# Patient Record
Sex: MALE | Race: WHITE | URBAN - METROPOLITAN AREA
[De-identification: names, ages, dates, MRNs, and addresses within clinical notes are randomized per-mention and may not be internally consistent; named-entity substitution may affect disease eponyms.]

---

## 2017-06-19 ENCOUNTER — TELEPHONE (OUTPATIENT)
Dept: BARIATRICS/WEIGHT MGMT | Age: 37
End: 2017-06-19

## 2017-06-23 ENCOUNTER — HOSPITAL ENCOUNTER (OUTPATIENT)
Dept: ULTRASOUND IMAGING | Age: 37
Discharge: HOME | End: 2017-06-23
Attending: INTERNAL MEDICINE | Admitting: INTERNAL MEDICINE

## 2017-06-23 DIAGNOSIS — R74.01 NONSPECIFIC ELEVATION OF LEVELS OF TRANSAMINASE OR LACTIC ACID DEHYDROGENASE (LDH): ICD-10-CM

## 2017-06-23 DIAGNOSIS — R74.02 NONSPECIFIC ELEVATION OF LEVELS OF TRANSAMINASE OR LACTIC ACID DEHYDROGENASE (LDH): ICD-10-CM

## 2017-06-23 DIAGNOSIS — R74.01 NONSPECIFIC ELEVATION OF LEVELS OF TRANSAMINASE AND LACTIC ACID DEHYDROGENASE (LDH): ICD-10-CM

## 2017-06-23 DIAGNOSIS — R74.02 NONSPECIFIC ELEVATION OF LEVELS OF TRANSAMINASE AND LACTIC ACID DEHYDROGENASE (LDH): ICD-10-CM

## 2018-01-24 ENCOUNTER — OFFICE VISIT (OUTPATIENT)
Dept: BARIATRICS/WEIGHT MGMT | Age: 38
End: 2018-01-24

## 2018-01-24 VITALS
RESPIRATION RATE: 16 BRPM | BODY MASS INDEX: 41.75 KG/M2 | SYSTOLIC BLOOD PRESSURE: 137 MMHG | HEART RATE: 107 BPM | WEIGHT: 315 LBS | DIASTOLIC BLOOD PRESSURE: 92 MMHG | HEIGHT: 73 IN

## 2018-01-24 DIAGNOSIS — R53.81 MALAISE AND FATIGUE: ICD-10-CM

## 2018-01-24 DIAGNOSIS — F32.9 REACTIVE DEPRESSION: ICD-10-CM

## 2018-01-24 DIAGNOSIS — R53.83 MALAISE AND FATIGUE: ICD-10-CM

## 2018-01-24 DIAGNOSIS — M54.2 NECK PAIN: ICD-10-CM

## 2018-01-24 DIAGNOSIS — R10.84 GENERALIZED ABDOMINAL PAIN: ICD-10-CM

## 2018-01-24 DIAGNOSIS — G89.29 CHRONIC BILATERAL LOW BACK PAIN WITHOUT SCIATICA: ICD-10-CM

## 2018-01-24 DIAGNOSIS — M54.50 CHRONIC BILATERAL LOW BACK PAIN WITHOUT SCIATICA: ICD-10-CM

## 2018-01-24 DIAGNOSIS — G44.211 INTRACTABLE EPISODIC TENSION-TYPE HEADACHE: ICD-10-CM

## 2018-01-24 DIAGNOSIS — R06.02 SOB (SHORTNESS OF BREATH): ICD-10-CM

## 2018-01-24 DIAGNOSIS — F41.9 ANXIETY: ICD-10-CM

## 2018-01-24 DIAGNOSIS — E66.01 MORBID OBESITY WITH BMI OF 50.0-59.9, ADULT (HCC): Primary | ICD-10-CM

## 2018-01-24 DIAGNOSIS — K76.0 FATTY LIVER: ICD-10-CM

## 2018-01-24 DIAGNOSIS — R11.0 NAUSEA: ICD-10-CM

## 2018-01-24 DIAGNOSIS — N39.3 SUI (STRESS URINARY INCONTINENCE), MALE: ICD-10-CM

## 2018-01-24 DIAGNOSIS — E11.69 DIABETES MELLITUS TYPE 2 IN OBESE (HCC): ICD-10-CM

## 2018-01-24 DIAGNOSIS — I10 ESSENTIAL HYPERTENSION: ICD-10-CM

## 2018-01-24 DIAGNOSIS — G47.33 OSA (OBSTRUCTIVE SLEEP APNEA): ICD-10-CM

## 2018-01-24 DIAGNOSIS — M79.89 LEG SWELLING: ICD-10-CM

## 2018-01-24 DIAGNOSIS — R12 HEARTBURN: ICD-10-CM

## 2018-01-24 DIAGNOSIS — E66.9 DIABETES MELLITUS TYPE 2 IN OBESE (HCC): ICD-10-CM

## 2018-01-24 PROCEDURE — S9470 NUTRITIONAL COUNSELING, DIET: HCPCS | Performed by: SURGERY

## 2018-01-24 PROCEDURE — 99205 OFFICE O/P NEW HI 60 MIN: CPT | Performed by: SURGERY

## 2018-01-24 RX ORDER — LISINOPRIL 5 MG/1
5 TABLET ORAL DAILY
COMMUNITY

## 2018-01-24 RX ORDER — VENLAFAXINE HYDROCHLORIDE 150 MG/1
150 TABLET, EXTENDED RELEASE ORAL
COMMUNITY

## 2018-01-24 RX ORDER — BACITRACIN 500 UNIT/G
OINTMENT (GRAM) TOPICAL
COMMUNITY
End: 2018-04-04 | Stop reason: ALTCHOICE

## 2018-01-24 RX ORDER — VIT C/B6/B5/MAGNESIUM/HERB 173 50-5-6-5MG
CAPSULE ORAL 3 TIMES DAILY
COMMUNITY

## 2018-01-24 RX ORDER — HYDROCODONE BITARTRATE AND ACETAMINOPHEN 5; 325 MG/1; MG/1
1 TABLET ORAL EVERY 6 HOURS PRN
COMMUNITY

## 2018-01-24 RX ORDER — IBUPROFEN 200 MG
200 TABLET ORAL EVERY 6 HOURS PRN
COMMUNITY
End: 2018-05-16

## 2018-01-24 ASSESSMENT — ENCOUNTER SYMPTOMS
DOUBLE VISION: 0
SPUTUM PRODUCTION: 0
PHOTOPHOBIA: 0
STRIDOR: 0
CONSTIPATION: 0
EYE REDNESS: 0
BLURRED VISION: 0
HEARTBURN: 1
DIARRHEA: 0
WHEEZING: 0
EYE PAIN: 0
VOMITING: 0
BACK PAIN: 1
NAUSEA: 1
EYE DISCHARGE: 0
ORTHOPNEA: 0
BLOOD IN STOOL: 0
SORE THROAT: 0
ABDOMINAL PAIN: 1
COUGH: 0
SHORTNESS OF BREATH: 1
HEMOPTYSIS: 0

## 2018-01-24 NOTE — PROGRESS NOTES
Bariatric Surgery Consultation    Betty Pizarro, 1980, 30 y.o.,  male, CSN:   01/29/18     Chief Complaint:    Chief Complaint   Patient presents with    Bariatric, Initial Visit     1st WM visit, diet, exercise and pre-surgical weight. SUBJECTIVE:  Serafin Jimenez is a 30 y.o. male being seen for morbid obesity, considering weight loss surgery; Christophe's, Height: 6' 1\" (185.4 cm), Weight: (!) 377 lb 8 oz (171.2 kg), Current Body mass index is 49.81 kg/m². The patient's PCP is Lizette Acosta MD    HPI:  Serafin Jimenez has suffered from overweight / obesity for (27) years, and was of gradual onset but progressive course - tried MANY different diets and on and off over the weeks, months and years depression   and work status is works  on workers comp now disc injury and has 2 children. L4/L5 injury  HTN  STEVIE  DM type II - A1C 9.1 1 month ago  Depression  Anxiety  Back pain  Fatty liver        Impression   Prominent fatty infiltration of the liver.  Otherwise unremarkable right   upper quadrant ultrasound. Christophe's life is significantly affected by weight related to his co-morbidities. The patient has also tried but failed self directed diet and exercise. Comorbid Conditions:  Significant diseases affecting this patient are   Past Medical History:   Diagnosis Date    Anxiety     Depression     Fatty liver     Hypertension     Obesity     Sleep apnea     Type 2 diabetes mellitus without complication ([facility] [address]    . Review of Systems - Review of Systems   Constitutional: Positive for malaise/fatigue. Negative for chills, diaphoresis, fever and weight loss. HENT: Negative for congestion, ear discharge, ear pain, hearing loss, nosebleeds, sore throat and tinnitus. Eyes: Negative for blurred vision, double vision, photophobia, pain, discharge and redness. Respiratory: Positive for shortness of breath.  Negative for cough, hemoptysis, sputum production, wheezing and stridor. Cardiovascular: Positive for leg swelling. Negative for chest pain, palpitations, orthopnea, claudication and PND. Gastrointestinal: Positive for abdominal pain, heartburn and nausea. Negative for blood in stool, constipation, diarrhea, melena and vomiting. Genitourinary: Positive for frequency and urgency. Negative for dysuria, flank pain and hematuria. Musculoskeletal: Positive for back pain, joint pain and neck pain. Negative for falls and myalgias. Neurological: Positive for headaches. Negative for dizziness, tingling, tremors, sensory change, speech change, focal weakness, seizures, loss of consciousness and weakness. Endo/Heme/Allergies: Negative for environmental allergies and polydipsia. Does not bruise/bleed easily. Psychiatric/Behavioral: Positive for depression. Negative for hallucinations, memory loss, substance abuse and suicidal ideas. The patient is not nervous/anxious and does not have insomnia. Otherwise per HPI. Allergies:  No Known Allergies    Medications:  Current Outpatient Prescriptions   Medication Sig Dispense Refill    metFORMIN (GLUCOPHAGE) 1000 MG tablet Take 1,000 mg by mouth 2 times daily (with meals)      dapagliflozin (FARXIGA) 5 MG tablet Take 5 mg by mouth every morning      HYDROcodone-acetaminophen (NORCO) 5-325 MG per tablet Take 1 tablet by mouth every 6 hours as needed for Pain.       lisinopril (PRINIVIL;ZESTRIL) 5 MG tablet Take 5 mg by mouth daily      venlafaxine 150 MG extended release tablet Take 150 mg by mouth daily (with breakfast)      Saw [place] 160 MG CAPS Take by mouth      Turmeric 500 MG CAPS Take by mouth three times daily      Quercetin 250 MG TABS Take 500 tablets by mouth Daily      Insulin Degludec (TRESIBA FLEXTOUCH) 200 UNIT/ML SOPN Inject into the skin      insulin lispro (HUMALOG) 100 UNIT/ML injection vial Inject 10 Units into the skin 3 times daily (before meals)      ibuprofen (ADVIL;MOTRIN) 200 Cognition and memory are normal.     ASSESSMENT & PLAN:    Patient Active Problem List   Diagnosis    Morbid obesity with BMI of 50.0-59.9, adult ([facility] [address]    Reactive depression    Essential hypertension    STEVIE (obstructive sleep apnea)    Diabetes mellitus type 2 in obese (HCC)    Anxiety    Chronic bilateral low back pain without sciatica    Fatty liver    Malaise and fatigue    SOB (shortness of breath)    Leg swelling    Generalized abdominal pain    Heartburn    Nausea    DEONTE (stress urinary incontinence), male    Neck pain    Intractable episodic tension-type headache       L4/L5 injury  HTN  STEVIE  DM type II - A1C 9.1 1 month ago  Depression  Anxiety  Back pain  Fatty liver        Impression   Prominent fatty infiltration of the liver.  Otherwise unremarkable right   upper quadrant ultrasound. The patient is good candidate for surgery. The patient is interested in the Robotic Sleeve Gastrectomy. Will continue work up toward surgery. Counseled extensively regardin) DIET and MONITOR the Weight:  - 1500 Kcal Diet/day. - Write down everything you eat and drink for 1 wk  - No calories from drinks  - Slim fast diet: 4 cans per day 1-2 days a week with only NO calories drinks those days    2) EXERCISE: 1 hr/day 5 days a week    3) DIETITIAN / NUTRITIONAL CONSULT, evaluation and counseling to roy healthy diet ~1500 Kcal /day    4) EXERCISE PHYSIOLOGIST CONSULT    5) PSYCHOLOGICAL EVALUATION    6) MONTHLY FOLLOW UP,  to follow and document diet and exercise trial    7) Planning a Sleeve Gastrectomy in few months    8) 2 Pictures were taken today to concretely monitor weight loss over time. 9) CARDIOLOGY OPTIMIZATION AND CLEARANCE BEFORE SURGERY    10) PULMONOLGY OPTIMIZATION AND CLEARANCE BEFORE SURGERY    WILL CHECK BASELINE BARIATRIC COMPREHENSIVE LABS.       Orders Placed This Encounter   Procedures    Basic Metabolic Panel    CBC Auto Differential    Hemoglobin A1C   

## 2018-01-25 PROBLEM — R11.0 NAUSEA: Status: ACTIVE | Noted: 2018-01-25

## 2018-01-25 PROBLEM — E11.69 DIABETES MELLITUS TYPE 2 IN OBESE (HCC): Status: ACTIVE | Noted: 2018-01-25

## 2018-01-25 PROBLEM — M79.89 LEG SWELLING: Status: ACTIVE | Noted: 2018-01-25

## 2018-01-25 PROBLEM — I10 ESSENTIAL HYPERTENSION: Status: ACTIVE | Noted: 2018-01-25

## 2018-01-25 PROBLEM — R53.83 MALAISE AND FATIGUE: Status: ACTIVE | Noted: 2018-01-25

## 2018-01-25 PROBLEM — F32.9 REACTIVE DEPRESSION: Status: ACTIVE | Noted: 2018-01-25

## 2018-01-25 PROBLEM — M54.2 NECK PAIN: Status: ACTIVE | Noted: 2018-01-25

## 2018-01-25 PROBLEM — R12 HEARTBURN: Status: ACTIVE | Noted: 2018-01-25

## 2018-01-25 PROBLEM — G47.33 OSA (OBSTRUCTIVE SLEEP APNEA): Status: ACTIVE | Noted: 2018-01-25

## 2018-01-25 PROBLEM — R53.81 MALAISE AND FATIGUE: Status: ACTIVE | Noted: 2018-01-25

## 2018-01-25 PROBLEM — R06.02 SOB (SHORTNESS OF BREATH): Status: ACTIVE | Noted: 2018-01-25

## 2018-01-25 PROBLEM — M54.50 CHRONIC BILATERAL LOW BACK PAIN WITHOUT SCIATICA: Status: ACTIVE | Noted: 2018-01-25

## 2018-01-25 PROBLEM — R10.84 GENERALIZED ABDOMINAL PAIN: Status: ACTIVE | Noted: 2018-01-25

## 2018-01-25 PROBLEM — E66.9 DIABETES MELLITUS TYPE 2 IN OBESE (HCC): Status: ACTIVE | Noted: 2018-01-25

## 2018-01-25 PROBLEM — F41.9 ANXIETY: Status: ACTIVE | Noted: 2018-01-25

## 2018-01-25 PROBLEM — N39.3 SUI (STRESS URINARY INCONTINENCE), MALE: Status: ACTIVE | Noted: 2018-01-25

## 2018-01-25 PROBLEM — G44.211 INTRACTABLE EPISODIC TENSION-TYPE HEADACHE: Status: ACTIVE | Noted: 2018-01-25

## 2018-01-25 PROBLEM — K76.0 FATTY LIVER: Status: ACTIVE | Noted: 2018-01-25

## 2018-01-25 PROBLEM — G89.29 CHRONIC BILATERAL LOW BACK PAIN WITHOUT SCIATICA: Status: ACTIVE | Noted: 2018-01-25

## 2018-01-30 LAB
ALBUMIN SERPL-MCNC: 4.2 G/DL
ALP BLD-CCNC: 72 U/L
ALT SERPL-CCNC: 61 U/L
ANION GAP SERPL CALCULATED.3IONS-SCNC: 13.2 MMOL/L
AST SERPL-CCNC: 25 U/L
AVERAGE GLUCOSE: NORMAL
BASOPHILS ABSOLUTE: 0.06 /ΜL
BASOPHILS RELATIVE PERCENT: 1 %
BILIRUB SERPL-MCNC: 0.5 MG/DL (ref 0.1–1.4)
BUN BLDV-MCNC: 18 MG/DL
CALCIUM SERPL-MCNC: 9.7 MG/DL
CHLORIDE BLD-SCNC: 102 MMOL/L
CHOLESTEROL, TOTAL: 171 MG/DL
CHOLESTEROL/HDL RATIO: 5.5
CO2: 23 MMOL/L
CREAT SERPL-MCNC: 0.7 MG/DL
EOSINOPHILS ABSOLUTE: 0.19 /ΜL
EOSINOPHILS RELATIVE PERCENT: 3.1 %
GFR CALCULATED: 136
GLUCOSE BLD-MCNC: 209 MG/DL
HBA1C MFR BLD: 10.3 %
HCT VFR BLD CALC: 43.6 % (ref 41–53)
HDLC SERPL-MCNC: 31 MG/DL (ref 35–70)
HEMOGLOBIN: 13 G/DL (ref 13.5–17.5)
LDL CHOLESTEROL CALCULATED: 104 MG/DL (ref 0–160)
LYMPHOCYTES ABSOLUTE: 2.07 /ΜL
LYMPHOCYTES RELATIVE PERCENT: 33.4 %
MCH RBC QN AUTO: 20.1 PG
MCHC RBC AUTO-ENTMCNC: 29.8 G/DL
MCV RBC AUTO: 67.3 FL
MONOCYTES ABSOLUTE: 0.35 /ΜL
MONOCYTES RELATIVE PERCENT: 5.7 %
NEUTROPHILS ABSOLUTE: 3.49 /ΜL
NEUTROPHILS RELATIVE PERCENT: 56.3 %
PDW BLD-RTO: 17.9 %
PLATELET # BLD: 230 K/ΜL
PMV BLD AUTO: 11 FL
POTASSIUM SERPL-SCNC: 4.3 MMOL/L
RBC # BLD: 6.48 10^6/ΜL
SODIUM BLD-SCNC: 138 MMOL/L
TOTAL PROTEIN: 7.5
TRIGL SERPL-MCNC: 178 MG/DL
VLDLC SERPL CALC-MCNC: 36 MG/DL
WBC # BLD: 6.19 10^3/ML

## 2018-01-31 ENCOUNTER — INITIAL CONSULT (OUTPATIENT)
Dept: BARIATRICS/WEIGHT MGMT | Age: 38
End: 2018-01-31

## 2018-01-31 VITALS
HEART RATE: 88 BPM | WEIGHT: 315 LBS | DIASTOLIC BLOOD PRESSURE: 84 MMHG | HEIGHT: 73 IN | SYSTOLIC BLOOD PRESSURE: 128 MMHG | BODY MASS INDEX: 41.75 KG/M2

## 2018-01-31 DIAGNOSIS — E66.01 MORBID OBESITY WITH BMI OF 45.0-49.9, ADULT (HCC): Primary | ICD-10-CM

## 2018-01-31 PROCEDURE — 99999 PR OFFICE/OUTPT VISIT,PROCEDURE ONLY: CPT

## 2018-01-31 NOTE — PROGRESS NOTES
Outpatient Nutrition Counseling    REASON FOR VISIT: Initial Nutrition Counseling    Chief Complaint:    Chief Complaint   Patient presents with    Weight Management       SUBJECTIVE:  Pt here for first visit. Has started to count calories and log food intake. Staying around 2500 kcals/day and has lost 4.9 lbs in 6 days. Eating more protein and veggies and drinking more water. Has cut out most sweets and is watching portions on snacks. He has ad success in past with weight loss but never able to maintain the loss. Very motivated and supportive wife. The patient is a 30 y.o. male being seen for morbid obesity, considering weight loss surgery; Christophe's, Height: 6' 1\" (185.4 cm), Weight: (!) 372 lb 9.6 oz (169 kg), Current Body mass index is 49.16 kg/m². The patient's PCP is Marisa Leung MD   The patient first recognized that he had a weight problem about 30 years ago. The patient's lowest weight in the last five years was 300 lbs, and the highest weight in the last five years was 390 lbs. Weight Loss Program History:  The patient states he has tried meal replacement shakes, diabetic diet, exercise. The most weight lost ever with diet and exercise was 50 Lbs, but unfortunately only kept them of for a short time. Christophe's life is significantly affected by weight related to his co-morbidities. Comorbid Conditions:  Significant diseases affecting this patient are   Past Medical History:   Diagnosis Date    Anxiety     Depression     Fatty liver     Hypertension     Obesity     Sleep apnea     Type 2 diabetes mellitus without complication ([facility] [address]    . Review of Systems - ROS  Otherwise per HPI. Allergies:  No Known Allergies    Past Surgical History:  Past Surgical History:   Procedure Laterality Date    VASECTOMY  2012       Family History:  No family history on file.     Social History:  Social History     Social History    Marital status:      Spouse name: N/A    Number of

## 2018-02-07 ENCOUNTER — HOSPITAL ENCOUNTER (OUTPATIENT)
Dept: PHYSICAL THERAPY | Age: 38
Discharge: HOME | End: 2018-02-08
Attending: SURGERY | Admitting: SURGERY

## 2018-02-07 NOTE — PLAN OF CARE
Current functional level (based on UE/LE strength)  Low disability 1-19%      Plan of Care/Treatment to date:  [] Therapeutic Exercise    [] Aquatics:  [x] Therapeutic Activity    [] Ultrasound  [] Elec Stimulation  [] Gait Training     [] Cervical Traction [] Lumbar Traction  [] Neuromuscular Re-education [] Cold/hotpack [] Iontophoresis   [] Instruction in HEP       [] Manual Therapy     [] vasopneumatic            [x] Self care home management        []Dry needling trigger point point/pain management    ? Frequency/Duration: Pt not to return after Evaluation, he has a high co-pay and has workout equipment in his basement. Pt is Discharged Today. Goals:   Long term goals  Time Frame for Long term goals : All goals to be assessed by discharge  Long term goal 1: Pt to be independent with idea of HEP - MET  Long term goal 2: Pt to be instructed in alternating strength training arms/legs and cardio-fitness - MET  Electronically signed by:  Rikki Avendano PT, 2/7/2018, 1:16 PM              If you have any questions or concerns, please don't hesitate to call.   Thank you for your referral.      Physician Signature:_________________Date:____________Time: ________  By signing above, therapists plan is approved by physician

## 2018-02-08 PROCEDURE — 97161 PT EVAL LOW COMPLEX 20 MIN: CPT | Performed by: SURGERY

## 2018-02-14 DIAGNOSIS — E66.01 MORBID OBESITY WITH BMI OF 50.0-59.9, ADULT (HCC): ICD-10-CM

## 2018-02-27 ENCOUNTER — OFFICE VISIT (OUTPATIENT)
Dept: BARIATRICS/WEIGHT MGMT | Age: 38
End: 2018-02-27

## 2018-02-27 VITALS
HEIGHT: 73 IN | WEIGHT: 315 LBS | SYSTOLIC BLOOD PRESSURE: 125 MMHG | DIASTOLIC BLOOD PRESSURE: 76 MMHG | RESPIRATION RATE: 16 BRPM | BODY MASS INDEX: 41.75 KG/M2

## 2018-02-27 DIAGNOSIS — E66.01 MORBID OBESITY WITH BMI OF 45.0-49.9, ADULT (HCC): Primary | ICD-10-CM

## 2018-02-27 DIAGNOSIS — E66.9 DIABETES MELLITUS TYPE 2 IN OBESE (HCC): ICD-10-CM

## 2018-02-27 DIAGNOSIS — E11.69 DIABETES MELLITUS TYPE 2 IN OBESE (HCC): ICD-10-CM

## 2018-02-27 DIAGNOSIS — I10 ESSENTIAL HYPERTENSION: ICD-10-CM

## 2018-02-27 DIAGNOSIS — Z01.818 PRE-OP EVALUATION: ICD-10-CM

## 2018-02-27 DIAGNOSIS — G47.33 OSA (OBSTRUCTIVE SLEEP APNEA): ICD-10-CM

## 2018-02-27 PROCEDURE — 99214 OFFICE O/P EST MOD 30 MIN: CPT | Performed by: NURSE PRACTITIONER

## 2018-02-27 RX ORDER — DOXYCYCLINE HYCLATE 100 MG/1
1 CAPSULE ORAL 2 TIMES DAILY
COMMUNITY
Start: 2018-02-05 | End: 2018-04-04 | Stop reason: ALTCHOICE

## 2018-02-27 ASSESSMENT — ENCOUNTER SYMPTOMS
ABDOMINAL DISTENTION: 0
DIARRHEA: 0
BACK PAIN: 1
WHEEZING: 0
RHINORRHEA: 0
NAUSEA: 0
CHEST TIGHTNESS: 0
SHORTNESS OF BREATH: 0
TROUBLE SWALLOWING: 0
EYE PAIN: 0
ABDOMINAL PAIN: 0

## 2018-02-27 NOTE — PROGRESS NOTES
Essential hypertension    STEVIE (obstructive sleep apnea)    Diabetes mellitus type 2 in obese (HCC)    Anxiety    Chronic bilateral low back pain without sciatica    Fatty liver    Malaise and fatigue    SOB (shortness of breath)    Leg swelling    Generalized abdominal pain    Heartburn    Nausea    DEONTE (stress urinary incontinence), male    Neck pain    Intractable episodic tension-type headache     Past Surgical History:   Procedure Laterality Date    VASECTOMY  2012     Current Outpatient Prescriptions   Medication Sig Dispense Refill    metFORMIN (GLUCOPHAGE) 1000 MG tablet Take 1,000 mg by mouth 2 times daily (with meals)      dapagliflozin (FARXIGA) 5 MG tablet Take 5 mg by mouth every morning      HYDROcodone-acetaminophen (NORCO) 5-325 MG per tablet Take 1 tablet by mouth every 6 hours as needed for Pain.  lisinopril (PRINIVIL;ZESTRIL) 5 MG tablet Take 5 mg by mouth daily      venlafaxine 150 MG extended release tablet Take 150 mg by mouth daily (with breakfast)      Saw [place] 160 MG CAPS Take by mouth      Turmeric 500 MG CAPS Take by mouth three times daily      Quercetin 250 MG TABS Take 500 tablets by mouth Daily      Insulin Degludec (TRESIBA FLEXTOUCH) 200 UNIT/ML SOPN Inject into the skin      insulin lispro (HUMALOG) 100 UNIT/ML injection vial Inject 10 Units into the skin 3 times daily (before meals)      ibuprofen (ADVIL;MOTRIN) 200 MG tablet Take 200 mg by mouth every 6 hours as needed for Pain      doxycycline hyclate (VIBRAMYCIN) 100 MG capsule Take 1 capsule by mouth 2 times daily       No current facility-administered medications for this visit. No Known Allergies        Review of Systems   Constitutional: Negative. Negative for appetite change, fatigue and fever. HENT: Negative for congestion, dental problem, hearing loss, rhinorrhea and trouble swallowing. Eyes: Negative for pain.    Respiratory: Negative for chest tightness, shortness of breath and wheezing. Cardiovascular: Negative for chest pain, palpitations and leg swelling. Gastrointestinal: Negative for abdominal distention, abdominal pain, diarrhea and nausea. Endocrine: Negative for cold intolerance and polydipsia. +DM   Genitourinary: Negative for difficulty urinating and frequency. Musculoskeletal: Positive for arthralgias, back pain and myalgias. Negative for gait problem. Skin: Negative for rash. Allergic/Immunologic: Negative for environmental allergies. Neurological: Negative for dizziness, seizures and syncope. Hematological: Does not bruise/bleed easily. Psychiatric/Behavioral: Negative for behavioral problems and suicidal ideas. OBJECTIVE:    /76   Resp 16   Ht 6' 1\" (1.854 m)   Wt (!) 364 lb 8 oz (165.3 kg)   BMI 48.09 kg/m²      Physical Exam   Constitutional: He is oriented to person, place, and time. He appears well-developed and well-nourished. Obese   HENT:   Head: Normocephalic and atraumatic. Right Ear: Hearing and ear canal normal.   Left Ear: Hearing and ear canal normal.   Nose: Nose normal.   Mouth/Throat: Uvula is midline and oropharynx is clear and moist.   Eyes: Conjunctivae are normal. Pupils are equal, round, and reactive to light. Neck: Normal range of motion. Cardiovascular: Normal rate, regular rhythm and normal heart sounds. Pulmonary/Chest: Effort normal and breath sounds normal. He has no decreased breath sounds. He has no wheezes. He has no rhonchi. He has no rales. Abdominal: Soft. Bowel sounds are normal. There is no tenderness. Musculoskeletal: Normal range of motion. He exhibits no tenderness. In all 4 extremities. Neurological: He is alert and oriented to person, place, and time. He has normal strength. He exhibits normal muscle tone. GCS eye subscore is 4. GCS verbal subscore is 5. GCS motor subscore is 6. Skin: Skin is warm and dry. No rash noted. Psychiatric: He has a normal mood and affect. His behavior is normal. Judgment normal.   Nursing note and vitals reviewed. ASSESSMENT & PLAN:    1. Morbid obesity with BMI of 45.0-49.9, adult ([address]  - Doing great with weight loss and calorie counting.   - Continue working on hydration- reducing pop (diet). - Reviewed all labs in detail.   - Discussed below recommendations in detail.   - Follow up in 1 month. 2. STEVIE (obstructive sleep apnea)  - Hx of STEVIE, currently on CPAP. - Unknown stability?  - Follows with [facility] pulmonary, referral placed for clearance. - Amb External Referral To Pulmonology    3. Pre-op evaluation  - Amb External Referral To Pulmonology  [facility], Marian Deluca MD    4. Essential hypertension  - Stable today at visit. - continue lisinopril 5 mg daily.   - Follow with PCP for management. 5. Diabetes mellitus type 2 in obese (HCC)  - Uncontrolled, recent A1C 10.3.   - Will need rechecked prior to surgery. - Blood sugars have been much lower in 100's. - Discussed appropriate protein foods, need to continue weight loss. - Continue Metformin and insulin daily.   - Management per PCP- if no improvement within a couple months may need to refer to endocrine. Patient was encouraged to continue to keep track of food diary- using phone. Protein intake is to be a minimum of 40-50 grams per day. Water drinking was encouraged with a goal of 64oz-128oz daily. Continue to increase level of physical activity. I spent 25 minutes with the patient face to face today and over 50% of the office visit today was spent in face to face counseling regarding diet and exercise, in preparation for his planned Robotic Sleeve Gastrectomy.   Discussed in length complying with the dietary recommendations, complying with the preoperative workup including dietary counseling completed, exercise physiologist counseling cmpleted, and pre-operative optimization of pulmonologist referral placed and cardiologist referral placed. The patient expressed understanding and willingness to comply nicely; all questions and concerns addressed. No orders of the defined types were placed in this encounter. Orders Placed This Encounter   Procedures    Amb External Referral To Pulmonology     Referral Priority:   Routine     Referral Type:   Consult for Advice and Opinion     Referral Reason:   Specialty Services Required     Requested Specialty:   Pulmonology     Number of Visits Requested:   [facility], Sejal Hurst MD     Referral Priority:   Routine     Referral Type:   Consult for Advice and Opinion     Referral Reason:   Specialty Services Required     Referred to Provider:   Radha Barraza MD     Requested Specialty:   Cardiology     Number of Visits Requested:   1       Follow Up:  Return in about 1 month (around 3/27/2018).     Claudean Jan, CNP

## 2018-03-22 ENCOUNTER — OFFICE VISIT (OUTPATIENT)
Dept: BARIATRICS/WEIGHT MGMT | Age: 38
End: 2018-03-22

## 2018-03-22 VITALS
DIASTOLIC BLOOD PRESSURE: 77 MMHG | HEART RATE: 88 BPM | BODY MASS INDEX: 41.75 KG/M2 | RESPIRATION RATE: 16 BRPM | SYSTOLIC BLOOD PRESSURE: 120 MMHG | WEIGHT: 315 LBS | HEIGHT: 73 IN

## 2018-03-22 DIAGNOSIS — E66.01 MORBID OBESITY WITH BMI OF 45.0-49.9, ADULT (HCC): ICD-10-CM

## 2018-03-22 DIAGNOSIS — E66.9 DIABETES MELLITUS TYPE 2 IN OBESE (HCC): ICD-10-CM

## 2018-03-22 DIAGNOSIS — Z01.818 PRE-OP EVALUATION: Primary | ICD-10-CM

## 2018-03-22 DIAGNOSIS — E11.69 DIABETES MELLITUS TYPE 2 IN OBESE (HCC): ICD-10-CM

## 2018-03-22 PROCEDURE — 99213 OFFICE O/P EST LOW 20 MIN: CPT | Performed by: NURSE PRACTITIONER

## 2018-03-22 ASSESSMENT — ENCOUNTER SYMPTOMS
EYE PAIN: 0
TROUBLE SWALLOWING: 0
WHEEZING: 0
RHINORRHEA: 0
ABDOMINAL DISTENTION: 0
CHEST TIGHTNESS: 0
BACK PAIN: 1
NAUSEA: 0
SHORTNESS OF BREATH: 0
DIARRHEA: 0
ABDOMINAL PAIN: 0

## 2018-03-22 NOTE — PROGRESS NOTES
BARIATRIC SURGERY OFFICE NOTE    SUBJECTIVE:    Patient presenting today referred from Selene Baugh MD, for   Chief Complaint   Patient presents with   [facility] Weight Management     3rd WM visit, diet, exercise and pre-surgical weight loss. .    Vitals:    03/22/18 1052   BP: 120/77   Pulse: 88   Resp: 16        BMI: Body mass index is 48.18 kg/m². Obesity Classification: III Morbid Obesity. Weight History: Wt Readings from Last 3 Encounters:   03/22/18 (!) 365 lb 3.2 oz (165.7 kg)   02/27/18 (!) 364 lb 8 oz (165.3 kg)   01/31/18 (!) 372 lb 9.6 oz (169 kg)       HPI: Kellie Carias is a 30 y.o. male presenting in third bariatric visit, follow up diet and exercise - pre-operative weight loss, in consideration for bariatric surgery. Patient dines out to a sit down restaurant 0 times per month. Patient eats fast food meals 0 times per month. Drinks mostly water and diet soda    24 hour recall/food frequency chart:  Breakfast: Equate protein shake  Snack: cherry tomato  Lunch: 4 oz sirloin steak with 3 eggs and light toast  Snack: green apple  Dinner: stir [place]all[place] veggies and noodles  Snack: none    Total daily calories: 2400      Exercises: walking 30-50 min per day everyday of the week, sometimes weights. Still counting calories but did not stick to budget as well this week. Blood sugars have been much better, postprandial 130. Rechecking labs tomorrow, following up with PCP. Total weight loss/gain -12.3 Lbs over 3 month. Thoroughly reviewed the patient's medical history, family history, social history and review of systems with the patient today in the office. Please see medical record for pertinent positives.       Past Medical History:   Diagnosis Date    Anxiety     Depression     Fatty liver     Hypertension     Obesity     Sleep apnea     Type 2 diabetes mellitus without complication [facility])       Patient Active Problem List   Diagnosis    Morbid obesity with BMI of 45.0-49.9, adult ([facility] [address]    Reactive depression    Essential hypertension    STEVIE (obstructive sleep apnea)    Diabetes mellitus type 2 in obese (HCC)    Anxiety    Chronic bilateral low back pain without sciatica    Fatty liver    Malaise and fatigue    SOB (shortness of breath)    Leg swelling    Generalized abdominal pain    Heartburn    Nausea    DEONTE (stress urinary incontinence), male    Neck pain    Intractable episodic tension-type headache     Past Surgical History:   Procedure Laterality Date    VASECTOMY  2012     Current Outpatient Prescriptions   Medication Sig Dispense Refill    doxycycline hyclate (VIBRAMYCIN) 100 MG capsule Take 1 capsule by mouth 2 times daily      metFORMIN (GLUCOPHAGE) 1000 MG tablet Take 1,000 mg by mouth 2 times daily (with meals)      dapagliflozin (FARXIGA) 5 MG tablet Take 5 mg by mouth every morning      HYDROcodone-acetaminophen (NORCO) 5-325 MG per tablet Take 1 tablet by mouth every 6 hours as needed for Pain.  lisinopril (PRINIVIL;ZESTRIL) 5 MG tablet Take 5 mg by mouth daily      venlafaxine 150 MG extended release tablet Take 150 mg by mouth daily (with breakfast)      Saw [place] 160 MG CAPS Take by mouth      Turmeric 500 MG CAPS Take by mouth three times daily      Quercetin 250 MG TABS Take 500 tablets by mouth Daily      Insulin Degludec (TRESIBA FLEXTOUCH) 200 UNIT/ML SOPN Inject into the skin      insulin lispro (HUMALOG) 100 UNIT/ML injection vial Inject 10 Units into the skin 3 times daily (before meals)      ibuprofen (ADVIL;MOTRIN) 200 MG tablet Take 200 mg by mouth every 6 hours as needed for Pain       No current facility-administered medications for this visit. No Known Allergies        Review of Systems   Constitutional: Negative. Negative for appetite change, fatigue and fever. HENT: Negative for congestion, dental problem, hearing loss, rhinorrhea and trouble swallowing. Eyes: Negative for pain.    Respiratory: Negative for

## 2018-04-04 ENCOUNTER — INITIAL CONSULT (OUTPATIENT)
Dept: CARDIOLOGY CLINIC | Age: 38
End: 2018-04-04

## 2018-04-04 VITALS
RESPIRATION RATE: 16 BRPM | HEIGHT: 73 IN | SYSTOLIC BLOOD PRESSURE: 138 MMHG | WEIGHT: 315 LBS | HEART RATE: 88 BPM | BODY MASS INDEX: 41.75 KG/M2 | DIASTOLIC BLOOD PRESSURE: 88 MMHG

## 2018-04-04 DIAGNOSIS — Z01.818 PRE-OP EVALUATION: Primary | ICD-10-CM

## 2018-04-04 PROCEDURE — 93000 ELECTROCARDIOGRAM COMPLETE: CPT | Performed by: INTERNAL MEDICINE

## 2018-04-04 PROCEDURE — 99204 OFFICE O/P NEW MOD 45 MIN: CPT | Performed by: INTERNAL MEDICINE

## 2018-04-04 RX ORDER — GINKGO BILOBA LEAF EXTRACT 60 MG
CAPSULE ORAL
COMMUNITY

## 2018-04-04 RX ORDER — M-VIT,TX,IRON,MINS/CALC/FOLIC 27MG-0.4MG
1 TABLET ORAL DAILY
COMMUNITY

## 2018-04-04 RX ORDER — CHLORAL HYDRATE 500 MG
2000 CAPSULE ORAL DAILY
COMMUNITY

## 2018-04-10 ENCOUNTER — HOSPITAL ENCOUNTER (OUTPATIENT)
Dept: CARDIAC REHAB | Age: 38
Discharge: HOME | End: 2018-04-10
Attending: INTERNAL MEDICINE | Admitting: INTERNAL MEDICINE

## 2018-04-10 ENCOUNTER — PROCEDURE VISIT (OUTPATIENT)
Dept: CARDIOLOGY CLINIC | Age: 38
End: 2018-04-10

## 2018-04-10 DIAGNOSIS — Z01.818 PRE-OP EVALUATION: ICD-10-CM

## 2018-04-10 LAB
LV EF: 53 %
LV EF: 58 %
LVEF MODALITY: NORMAL
LVEF MODALITY: NORMAL

## 2018-04-10 PROCEDURE — 93018 CV STRESS TEST I&R ONLY: CPT | Performed by: INTERNAL MEDICINE

## 2018-04-10 PROCEDURE — A9500 TC99M SESTAMIBI: HCPCS | Performed by: INTERNAL MEDICINE

## 2018-04-10 PROCEDURE — 93016 CV STRESS TEST SUPVJ ONLY: CPT | Performed by: INTERNAL MEDICINE

## 2018-04-10 PROCEDURE — 78452 HT MUSCLE IMAGE SPECT MULT: CPT | Performed by: INTERNAL MEDICINE

## 2018-04-10 PROCEDURE — 93017 CV STRESS TEST TRACING ONLY: CPT | Performed by: INTERNAL MEDICINE

## 2018-04-19 ENCOUNTER — OFFICE VISIT (OUTPATIENT)
Dept: BARIATRICS/WEIGHT MGMT | Age: 38
End: 2018-04-19

## 2018-04-19 VITALS
WEIGHT: 315 LBS | HEART RATE: 91 BPM | RESPIRATION RATE: 16 BRPM | HEIGHT: 73 IN | SYSTOLIC BLOOD PRESSURE: 132 MMHG | DIASTOLIC BLOOD PRESSURE: 83 MMHG | BODY MASS INDEX: 41.75 KG/M2

## 2018-04-19 DIAGNOSIS — E11.69 DIABETES MELLITUS TYPE 2 IN OBESE (HCC): ICD-10-CM

## 2018-04-19 DIAGNOSIS — I10 ESSENTIAL HYPERTENSION: ICD-10-CM

## 2018-04-19 DIAGNOSIS — E66.9 DIABETES MELLITUS TYPE 2 IN OBESE (HCC): ICD-10-CM

## 2018-04-19 DIAGNOSIS — E66.01 MORBID OBESITY WITH BMI OF 45.0-49.9, ADULT (HCC): Primary | ICD-10-CM

## 2018-04-19 PROCEDURE — 99214 OFFICE O/P EST MOD 30 MIN: CPT | Performed by: NURSE PRACTITIONER

## 2018-04-19 ASSESSMENT — ENCOUNTER SYMPTOMS
WHEEZING: 0
RHINORRHEA: 0
CHEST TIGHTNESS: 0
DIARRHEA: 0
TROUBLE SWALLOWING: 0
ABDOMINAL PAIN: 0
ABDOMINAL DISTENTION: 0
BACK PAIN: 1
EYE PAIN: 0
SHORTNESS OF BREATH: 0
NAUSEA: 0

## 2018-05-14 ENCOUNTER — OFFICE VISIT (OUTPATIENT)
Dept: BARIATRICS/WEIGHT MGMT | Age: 38
End: 2018-05-14

## 2018-05-14 VITALS
WEIGHT: 315 LBS | SYSTOLIC BLOOD PRESSURE: 127 MMHG | RESPIRATION RATE: 16 BRPM | BODY MASS INDEX: 41.75 KG/M2 | DIASTOLIC BLOOD PRESSURE: 79 MMHG | HEIGHT: 73 IN | HEART RATE: 85 BPM

## 2018-05-14 DIAGNOSIS — E11.69 DIABETES MELLITUS TYPE 2 IN OBESE (HCC): ICD-10-CM

## 2018-05-14 DIAGNOSIS — E66.01 MORBID OBESITY WITH BMI OF 45.0-49.9, ADULT (HCC): Primary | ICD-10-CM

## 2018-05-14 DIAGNOSIS — E66.9 DIABETES MELLITUS TYPE 2 IN OBESE (HCC): ICD-10-CM

## 2018-05-14 PROCEDURE — 99214 OFFICE O/P EST MOD 30 MIN: CPT | Performed by: NURSE PRACTITIONER

## 2018-05-14 RX ORDER — OMEPRAZOLE 20 MG/1
20 CAPSULE, DELAYED RELEASE ORAL DAILY
COMMUNITY

## 2018-05-14 ASSESSMENT — ENCOUNTER SYMPTOMS
DIARRHEA: 0
SHORTNESS OF BREATH: 0
ABDOMINAL PAIN: 0
CHEST TIGHTNESS: 0
ABDOMINAL DISTENTION: 0
TROUBLE SWALLOWING: 0
NAUSEA: 0
WHEEZING: 0
EYE PAIN: 0
RHINORRHEA: 0

## 2018-05-16 ENCOUNTER — OFFICE VISIT (OUTPATIENT)
Dept: CARDIOLOGY CLINIC | Age: 38
End: 2018-05-16

## 2018-05-16 VITALS
DIASTOLIC BLOOD PRESSURE: 86 MMHG | HEIGHT: 73 IN | BODY MASS INDEX: 41.75 KG/M2 | WEIGHT: 315 LBS | RESPIRATION RATE: 16 BRPM | HEART RATE: 76 BPM | SYSTOLIC BLOOD PRESSURE: 124 MMHG

## 2018-05-16 DIAGNOSIS — Z01.810 PREOP CARDIOVASCULAR EXAM: Primary | ICD-10-CM

## 2018-05-16 PROCEDURE — 99214 OFFICE O/P EST MOD 30 MIN: CPT | Performed by: INTERNAL MEDICINE

## 2018-05-17 ENCOUNTER — TELEPHONE (OUTPATIENT)
Dept: CARDIOLOGY CLINIC | Age: 38
End: 2018-05-17

## 2018-06-29 ENCOUNTER — OFFICE VISIT (OUTPATIENT)
Dept: BARIATRICS/WEIGHT MGMT | Age: 38
End: 2018-06-29

## 2018-06-29 VITALS
SYSTOLIC BLOOD PRESSURE: 118 MMHG | BODY MASS INDEX: 41.75 KG/M2 | WEIGHT: 315 LBS | HEART RATE: 84 BPM | HEIGHT: 73 IN | RESPIRATION RATE: 16 BRPM | DIASTOLIC BLOOD PRESSURE: 78 MMHG

## 2018-06-29 DIAGNOSIS — G89.29 CHRONIC BILATERAL LOW BACK PAIN WITHOUT SCIATICA: ICD-10-CM

## 2018-06-29 DIAGNOSIS — E66.01 MORBID OBESITY WITH BMI OF 45.0-49.9, ADULT (HCC): Primary | ICD-10-CM

## 2018-06-29 DIAGNOSIS — R12 HEARTBURN: ICD-10-CM

## 2018-06-29 DIAGNOSIS — M79.89 LEG SWELLING: ICD-10-CM

## 2018-06-29 DIAGNOSIS — R06.02 SOB (SHORTNESS OF BREATH): ICD-10-CM

## 2018-06-29 DIAGNOSIS — I10 ESSENTIAL HYPERTENSION: ICD-10-CM

## 2018-06-29 DIAGNOSIS — E66.9 DIABETES MELLITUS TYPE 2 IN OBESE (HCC): ICD-10-CM

## 2018-06-29 DIAGNOSIS — F41.9 ANXIETY: ICD-10-CM

## 2018-06-29 DIAGNOSIS — F32.9 REACTIVE DEPRESSION: ICD-10-CM

## 2018-06-29 DIAGNOSIS — R11.0 NAUSEA: ICD-10-CM

## 2018-06-29 DIAGNOSIS — M54.2 NECK PAIN: ICD-10-CM

## 2018-06-29 DIAGNOSIS — R53.81 MALAISE AND FATIGUE: ICD-10-CM

## 2018-06-29 DIAGNOSIS — K76.0 FATTY LIVER: ICD-10-CM

## 2018-06-29 DIAGNOSIS — N39.3 SUI (STRESS URINARY INCONTINENCE), MALE: ICD-10-CM

## 2018-06-29 DIAGNOSIS — R53.83 MALAISE AND FATIGUE: ICD-10-CM

## 2018-06-29 DIAGNOSIS — G44.211 INTRACTABLE EPISODIC TENSION-TYPE HEADACHE: ICD-10-CM

## 2018-06-29 DIAGNOSIS — M54.50 CHRONIC BILATERAL LOW BACK PAIN WITHOUT SCIATICA: ICD-10-CM

## 2018-06-29 DIAGNOSIS — G47.33 OSA (OBSTRUCTIVE SLEEP APNEA): ICD-10-CM

## 2018-06-29 DIAGNOSIS — E11.69 DIABETES MELLITUS TYPE 2 IN OBESE (HCC): ICD-10-CM

## 2018-06-29 DIAGNOSIS — R10.84 GENERALIZED ABDOMINAL PAIN: ICD-10-CM

## 2018-06-29 PROCEDURE — 99214 OFFICE O/P EST MOD 30 MIN: CPT | Performed by: SURGERY

## 2018-06-29 ASSESSMENT — ENCOUNTER SYMPTOMS
SORE THROAT: 0
COUGH: 0
BLOOD IN STOOL: 0
VOMITING: 0
PHOTOPHOBIA: 0
DIARRHEA: 0
ABDOMINAL PAIN: 1
NAUSEA: 0
VOICE CHANGE: 0
ANAL BLEEDING: 0
COLOR CHANGE: 0
SHORTNESS OF BREATH: 1
CONSTIPATION: 0
WHEEZING: 0
TROUBLE SWALLOWING: 0
BACK PAIN: 1

## 2018-07-10 ENCOUNTER — TELEPHONE (OUTPATIENT)
Dept: BARIATRICS/WEIGHT MGMT | Age: 38
End: 2018-07-10